# Patient Record
Sex: FEMALE | Race: WHITE
[De-identification: names, ages, dates, MRNs, and addresses within clinical notes are randomized per-mention and may not be internally consistent; named-entity substitution may affect disease eponyms.]

---

## 2018-07-07 ENCOUNTER — HOSPITAL ENCOUNTER (EMERGENCY)
Dept: HOSPITAL 74 - JER | Age: 38
Discharge: HOME | End: 2018-07-07
Payer: COMMERCIAL

## 2018-07-07 VITALS — BODY MASS INDEX: 30.7 KG/M2

## 2018-07-07 VITALS — SYSTOLIC BLOOD PRESSURE: 118 MMHG | TEMPERATURE: 98.1 F | HEART RATE: 70 BPM | DIASTOLIC BLOOD PRESSURE: 71 MMHG
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
## 2018-07-07 DIAGNOSIS — S99.812A: Primary | ICD-10-CM
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
## 2018-07-07 NOTE — PDOC
*Physical Exam





- Vital Signs


 Last Vital Signs











Temp Pulse Resp BP Pulse Ox


 


 97.4 F L  74   18   118/86   98 


 


 07/07/18 05:54  07/07/18 05:54  07/07/18 05:54  07/07/18 05:54  07/07/18 05:54














- Physical Exam


Respiratory/Chest: positive: Lungs Clear, Normal Breath Sounds


Cardiovascular: positive: S1, S2


Vascular Pulses: Dorsalis-Pedis (R): 2+, Doralis-Pedis (L): 2+


Extremity: positive: Normal Capillary Refill, Normal Inspection.  negative: 

Coldness, Cyanosis





ED Treatment Course





- Medications


Given in the ED: 


ED Medications














Discontinued Medications














Generic Name Dose Route Start Last Admin





  Trade Name Freq  PRN Reason Stop Dose Admin


 


Acetaminophen  325 mg  07/07/18 06:39  07/07/18 06:41





  Tylenol -  PO  07/07/18 06:40  325 mg





  ONCE ONE   Administration





     





     





     





     


 


Oxycodone/Acetaminophen  1 combo  07/07/18 06:39  07/07/18 06:40





  Percocet 5/325 -  PO  07/07/18 06:40  1 combo





  ONCE ONE   Administration





     





     





     





     














Medical Decision Making





- Medical Decision Making





07/07/18 07:40


38 year old female presents w/ R foot pain following manual eversion of her 

foot while walking.  Patient has been ambulatory with significant pain. 





Wet read of X-ray shows no fracture/dislocation.   Pain control with NSAIDS, 

elevation of limb, while awaiting formal XR report.  





07/07/18 08:25


X ray shows no fracture.  Will discharge home with supportive care.








I discussed the physical exam findings, ancillary test results and final 

diagnoses with the patient. I answered all of the patient's questions. The 

patient was satisfied with the care received and felt comfortable with the 

discharge plan and treatment plan. The patient will return to the Emergency 

Department with any new, persistent or worsening symptoms.








*DC/Admit/Observation/Transfer


Diagnosis at time of Disposition: 


 Injury, foot








- Discharge Dispostion


Disposition: HOME


Condition at time of disposition: Good


Decision to Admit order: No





- Referrals


Referrals: 


Yovanny Gomez MD [Staff Physician] - 





- Patient Instructions


Printed Discharge Instructions:  DI for Foot Sprain


Additional Instructions: 


You can alternate between Motrin and Tylenol for your pain.  Should your pain 

persist for more than 5 days, please make an appointment to see orthopedic 

surgery (referral provided).  Return to the Emergency Department for any new/

worsening/concerning symptoms. 





- Post Discharge Activity

## 2018-07-07 NOTE — PDOC
History of Present Illness





- General


History Source: Patient


Exam Limitations: No Limitations





- History of Present Illness


Initial Comments: 





07/07/18 06:22


The patient is a 38 year old female with no significant past medical history 

who presents to the emergency department for evaluation of right foot pain . 

The patient reports moderate right foot pain after rolling her right foot near 

the  drain while texting. Pt describes the right foot pain as sharp and 

severe, shooting up the right leg intermittently. 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  The patient 

reports taking ibuprofen 600mg with no alleviation to pain. 





The patient denies chest pain, shortness of breath, headache, fever, chills, 

nausea, vomiting, and any urinary/bowel symptoms. 





Allergies: NKDA


Social History: Social alcohol consumption and marijuana use reported. No 

reported cigarette use. 





<Win Miramontes - Last Filed: 07/07/18 06:23>





<Amber Walsh - Last Filed: 07/07/18 20:16>





- General


Chief Complaint: Pain


Stated Complaint: RIGHT FOOT INJURY


Time Seen by Provider: 07/07/18 05:56





Past History





<Win Miramontes - Last Filed: 07/07/18 06:23>





<Amber Walsh - Last Filed: 07/07/18 20:16>





- Past Medical History


Allergies/Adverse Reactions: 


 Allergies











Allergy/AdvReac Type Severity Reaction Status Date / Time


 


No Known Allergies Allergy   Verified 07/07/18 06:00











Home Medications: 


Ambulatory Orders





Alprazolam [Xanax] 0.5 mg PO ASDIR 07/07/18 











**Review of Systems





- Review of Systems


Able to Perform ROS?: Yes


Comments:: 


GENERAL/CONSTITUTIONAL: No fever or chills. No weakness.


HEAD, EYES, EARS, NOSE AND THROAT: No change in vision. No ear pain or 

discharge. No sore throat.


CARDIOVASCULAR: No chest pain or shortness of breath.


RESPIRATORY: No cough, wheezing, or hemoptysis.


GASTROINTESTINAL: No nausea, vomiting, diarrhea or constipation.


GENITOURINARY: No dysuria, frequency, or change in urination.


MUSCULOSKELETAL: (+)Right foot pain. No neck or back pain.


SKIN: No rash


NEUROLOGIC: No headache, vertigo, loss of consciousness, or change in strength/

sensation.


ENDOCRINE: No increased thirst. No abnormal weight change.


HEMATOLOGIC/LYMPHATIC: No anemia, easy bleeding, or history of blood clots.


ALLERGIC/IMMUNOLOGIC: No hives or skin allergy.








<Win Miramontes - Last Filed: 07/07/18 06:23>





*Physical Exam





- Vital Signs


 Last Vital Signs











Temp Pulse Resp BP Pulse Ox


 


 97.4 F L  74   18   118/86   98 


 


 07/07/18 05:54  07/07/18 05:54  07/07/18 05:54  07/07/18 05:54  07/07/18 05:54














- Physical Exam


Comments: 


GENERAL: Awake, alert, and fully oriented, in no acute distress


HEAD: No signs of trauma


EYES: PERRLA, EOMI, sclera anicteric, conjunctiva clear


ENT: Auricles normal inspection, hearing grossly normal, nares patent, 

oropharynx clear without exudates. Moist mucosa


NECK: Normal ROM, supple.


LUNGS: Breath sounds equal, clear to auscultation bilaterally.  No wheezes, and 

no crackles


HEART: Regular rate and rhythm, normal S1 and S2, no murmurs, rubs or gallops


ABDOMEN: Soft, nontender, normoactive bowel sounds.  No guarding, no rebound.  

No masses


EXTREMITIES: (+)Right foot pain at base of 5th metatarsal. (+)Mild swelling of 

soft tissue at lateral aspect of right foot, otherwise normal. 


NEUROLOGICAL: Cranial nerves II through XII grossly intact.  Normal speech.


SKIN: Warm, Dry, normal turgor, no rashes or lesions noted.








<Win Miramontes - Last Filed: 07/07/18 06:23>





Medical Decision Making





- Medical Decision Making





07/07/18 06:57


Pt will be signed out to the day team; awaiting official XR result.  SHe has a 

small calcified fragment (ruptured tendon?) lateral aspect of the foot.


2 ace wraps placed on the foot.





<Amber Walsh - Last Filed: 07/07/18 20:16>





*DC/Admit/Observation/Transfer





- Attestations


Scribe Attestion: 


Documentation prepared by Win Miramontes, acting as medical scribe for Amber Walsh MD.





<Win Miramontes - Last Filed: 07/07/18 06:23>





<Amber Walsh - Last Filed: 07/07/18 20:16>


Diagnosis at time of Disposition: 


 Injury, foot








- Discharge Dispostion


Disposition: HOME


Condition at time of disposition: Good





- Referrals


Referrals: 


Yovanny Gomez MD [Staff Physician] - 





- Patient Instructions


Printed Discharge Instructions:  DI for Foot Sprain


Additional Instructions: 


You can alternate between Motrin and Tylenol for your pain.  Should your pain 

persist for more than 5 days, please make an appointment to see orthopedic 

surgery (referral provided).  Return to the Emergency Department for any new/

worsening/concerning symptoms.